# Patient Record
Sex: FEMALE | HISPANIC OR LATINO
[De-identification: names, ages, dates, MRNs, and addresses within clinical notes are randomized per-mention and may not be internally consistent; named-entity substitution may affect disease eponyms.]

---
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
## 2023-10-13 ENCOUNTER — HOSPITAL ENCOUNTER (EMERGENCY)
Facility: OTHER | Age: 23
Discharge: HOME | End: 2023-10-13
Attending: EMERGENCY MEDICINE
Payer: COMMERCIAL

## 2023-10-13 VITALS
DIASTOLIC BLOOD PRESSURE: 66 MMHG | HEART RATE: 55 BPM | OXYGEN SATURATION: 99 % | RESPIRATION RATE: 16 BRPM | HEIGHT: 62 IN | SYSTOLIC BLOOD PRESSURE: 133 MMHG | BODY MASS INDEX: 22.08 KG/M2 | TEMPERATURE: 99 F | WEIGHT: 120 LBS
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
## 2023-10-13 LAB
HCV AB SERPL QL IA: NEGATIVE
HIV 1+2 AB+HIV1 P24 AG SERPL QL IA: NEGATIVE

## 2023-10-13 PROCEDURE — 87389 HIV-1 AG W/HIV-1&-2 AB AG IA: CPT | Performed by: PHYSICIAN ASSISTANT

## 2023-10-13 PROCEDURE — 99283 EMERGENCY DEPT VISIT LOW MDM: CPT

## 2023-10-13 PROCEDURE — 86803 HEPATITIS C AB TEST: CPT | Performed by: PHYSICIAN ASSISTANT

## 2023-10-13 NOTE — DISCHARGE INSTRUCTIONS
Follow-up for repeat blood work for hepatitis-C, hepatitis-B as well as HIV in 3-6 weeks and then 4-6 months.  Should be able to have this done at your healthcare clinic at the school or you can contact infectious disease.

## 2023-10-13 NOTE — ED PROVIDER NOTES
"     Source of History:  Patient    Chief complaint:  Body Fluid Exposure (Reports walking down street, stepped on needle on R foot. Sent by school nurse for possible needle stick exposure. )      HPI:  Winifred Randhawa is a 23 y.o. female presenting with complaint of possible needle stick that occurred last night.  She was wearing open toed shoes when she felt a poke on the great toe of her right foot.  She looked down and saw a needle on a syringe in the dirt.  When evaluating her foot she stated she had no blood and no pain.  She washed it at home and saw the school nurse today who redirected her to the emergency department.    This is the extent to the patients complaints today here in the emergency department.    ROS:   See HPI    Review of patient's allergies indicates:  No Known Allergies    PMH:  As per HPI and below:  No past medical history on file.  No past surgical history on file.         Physical Exam:    BP (!) 160/84   Pulse (!) 58   Temp 98.3 °F (36.8 °C) (Oral)   Resp 18   Ht 5' 2" (1.575 m)   Wt 54.4 kg (120 lb)   SpO2 99%   BMI 21.95 kg/m²   Nursing note and vital signs reviewed.  Constitutional: No acute distress.  Nontoxic  Eyes: No conjunctival injection. Extraocular muscles intact.  Musculoskeletal: Good range of motion all joints.  No deformities.  Neck supple.  No meningismus. Neurovascularly intact.        Summary of Medical Records:      MDM/ Differential Dx:   A 23-year-old female with out of the hospital questionable needle stick with a syringe found on the ground.  She stated that when she looked at the syringe it did not appear to be bloody or have any fluid on it.  Does not sound as if the needle broke the skin is there was no blood.  Will check for HIV as well as hepatitis-C and then I recommend that she follow-up with her school clinic for outpatient testing in 3-6 weeks and then again in 4-6 months.  Do not feel that any prophylaxis is indicated at this time.  I have explained " this with the patient.  Have discussed the very low risk of transmission in her case.    No further workup is indicated in the emergency department today.  I updated pt regarding results and I counseled pt regarding supportive care measures.  Diagnosis and treatment plan explained to patient. I have answered all questions and the patient is satisfied with the plan of care. Patient discharged home in stable condition.                  Diagnostic Impression:    1. Exposure to needle, initial encounter         ED Disposition Condition    Discharge Stable            ED Prescriptions    None       Follow-up Information       Follow up With Specialties Details Why Contact Info Additional Information    [facility] - Infect[facility] Schedule an appointment as soon as possible for a visit in 3 weeks  [address] [facility] [id number] [phone] Main Building, 1st floor near [address] entrance Please park in [facility].  parking is available on the first floor of the main parking garage.             Oscar Sosa, DO  10/13/23 [id number]

## 2023-10-13 NOTE — ED TRIAGE NOTES
Patient states she was walking down street last night with socks and open-toe shoes when she felt a stick, possibly to her great toe. When she looked down, she saw a needle attached to a syringe. When she removed her sock, she did not see any evidence of a needlestick but school advisor told her to be checked for possible body fluid exposure. States she cleaned the area with alcohol last night and currently does not have any pain or tenderness.

## 2023-10-13 NOTE — FIRST PROVIDER EVALUATION
Emergency Department TeleTriage Encounter Note      CHIEF COMPLAINT    Chief Complaint   Patient presents with    Body Fluid Exposure     Reports walking down street, stepped on needle on R foot. Sent by school nurse for possible needle stick exposure.        VITAL SIGNS   Initial Vitals [10/13/23 1648]   BP Pulse Resp Temp SpO2   (!) 160/84 (!) 58 18 98.3 °F (36.8 °C) 99 %      MAP       --            ALLERGIES    Review of patient's allergies indicates:  No Known Allergies    PROVIDER TRIAGE NOTE  Patient stepped on a needle/syringe while walking on the street last night 8:30pm. She felt pain when the needle stuck her, but did not notice any blood.       ORDERS  Labs Reviewed - No data to display    ED Orders (720h ago, onward)      None              Virtual Visit Note: The provider triage portion of this emergency department evaluation and documentation was performed via [employer], a HIPAA-compliant telemedicine application, in concert with a tele-presenter in the room. A face to face patient evaluation with one of my colleagues will occur once the patient is placed in an emergency department room.      DISCLAIMER: This note was prepared with [employer] voice recognition transcription software. Garbled syntax, mangled pronouns, and other bizarre constructions may be attributed to that software system.